# Patient Record
Sex: FEMALE | Race: WHITE | NOT HISPANIC OR LATINO | URBAN - METROPOLITAN AREA
[De-identification: names, ages, dates, MRNs, and addresses within clinical notes are randomized per-mention and may not be internally consistent; named-entity substitution may affect disease eponyms.]

---

## 2017-06-22 ENCOUNTER — OUTPATIENT (OUTPATIENT)
Dept: OUTPATIENT SERVICES | Facility: HOSPITAL | Age: 82
LOS: 1 days | Discharge: HOME | End: 2017-06-22

## 2017-06-22 ENCOUNTER — APPOINTMENT (OUTPATIENT)
Dept: SPEECH THERAPY | Facility: CLINIC | Age: 82
End: 2017-06-22

## 2017-06-22 ENCOUNTER — APPOINTMENT (OUTPATIENT)
Age: 82
End: 2017-06-22

## 2017-06-28 DIAGNOSIS — H90.3 SENSORINEURAL HEARING LOSS, BILATERAL: ICD-10-CM

## 2017-07-03 ENCOUNTER — APPOINTMENT (OUTPATIENT)
Dept: OTOLARYNGOLOGY | Facility: CLINIC | Age: 82
End: 2017-07-03

## 2017-07-03 VITALS
SYSTOLIC BLOOD PRESSURE: 151 MMHG | WEIGHT: 156 LBS | DIASTOLIC BLOOD PRESSURE: 81 MMHG | HEIGHT: 58 IN | BODY MASS INDEX: 32.75 KG/M2

## 2017-07-03 DIAGNOSIS — H93.13 TINNITUS, BILATERAL: ICD-10-CM

## 2017-07-03 DIAGNOSIS — H81.313 AURAL VERTIGO, BILATERAL: ICD-10-CM

## 2017-07-03 DIAGNOSIS — Q16.5 CONGENITAL MALFORMATION OF INNER EAR: ICD-10-CM

## 2017-07-03 DIAGNOSIS — H90.5 UNSPECIFIED SENSORINEURAL HEARING LOSS: ICD-10-CM

## 2017-07-03 RX ORDER — FUROSEMIDE 20 MG/1
20 TABLET ORAL
Refills: 0 | Status: ACTIVE | COMMUNITY

## 2017-07-03 RX ORDER — FEXOFENADINE HYDROCHLORIDE 180 MG/1
180 TABLET ORAL DAILY
Qty: 90 | Refills: 0 | Status: ACTIVE | COMMUNITY
Start: 2017-07-03 | End: 1900-01-01

## 2017-07-27 ENCOUNTER — MESSAGE (OUTPATIENT)
Age: 82
End: 2017-07-27

## 2017-07-28 ENCOUNTER — MESSAGE (OUTPATIENT)
Age: 82
End: 2017-07-28

## 2022-11-07 ENCOUNTER — OUTPATIENT (OUTPATIENT)
Dept: OUTPATIENT SERVICES | Facility: HOSPITAL | Age: 87
LOS: 1 days | Discharge: HOME | End: 2022-11-07

## 2022-11-07 ENCOUNTER — APPOINTMENT (OUTPATIENT)
Dept: SPEECH THERAPY | Facility: CLINIC | Age: 87
End: 2022-11-07

## 2022-11-09 DIAGNOSIS — H90.3 SENSORINEURAL HEARING LOSS, BILATERAL: ICD-10-CM

## 2023-03-06 ENCOUNTER — APPOINTMENT (OUTPATIENT)
Dept: SPEECH THERAPY | Facility: CLINIC | Age: 88
End: 2023-03-06

## 2023-03-08 NOTE — ASSESSMENT
[FreeTextEntry1] : Left ear impedances WNL\par \par Beige N8 processor with a 6cm coil cable in beige and a #1 regular magnet\par SN# [id number]\par Accessories: Mini Fam and phone clip-Gave contact for Yudelka Moreno to review accessories with family.\par \par Based on continued difficulties with the old 1200Hz map causing dizziness decided to try a new population mean map based on 900Hz 10 max PW 25\par \par P1 New population mean map based on live voice with a DR of 41 and volume 6\par P2 Same as P1\par \par Not being paired to a smart phone. May order a remote control.\par \par Reviewed batteries with the daughter and the aide.

## 2023-03-08 NOTE — HISTORY OF PRESENT ILLNESS
[FreeTextEntry1] : Left ear internal Freedom cochlear implant. Here today with N8 upgrade from N6 [FreeTextEntry8] : Accompanied by daughter Debbie and aide Archana. Still not wearing the N6 processor despite the changes made at last visit. Reports the maps make her dizzy.